# Patient Record
Sex: FEMALE | Race: BLACK OR AFRICAN AMERICAN | NOT HISPANIC OR LATINO | URBAN - METROPOLITAN AREA
[De-identification: names, ages, dates, MRNs, and addresses within clinical notes are randomized per-mention and may not be internally consistent; named-entity substitution may affect disease eponyms.]

---

## 2020-03-06 PROBLEM — 34000006 CROHN'S DISEASE: Status: ACTIVE | Noted: 2020-03-06

## 2020-03-06 PROBLEM — 271832001 FLATULENCE, ERUCTATION AND GAS PAIN: Status: ACTIVE | Noted: 2020-03-06

## 2020-06-12 ENCOUNTER — OFFICE VISIT (OUTPATIENT)
Dept: URBAN - METROPOLITAN AREA SURGERY CENTER 7 | Facility: SURGERY CENTER | Age: 65
End: 2020-06-12

## 2022-04-30 ENCOUNTER — TELEPHONE ENCOUNTER (OUTPATIENT)
Dept: URBAN - METROPOLITAN AREA CLINIC 121 | Facility: CLINIC | Age: 67
End: 2022-04-30

## 2022-04-30 RX ORDER — TRAMADOL HYDROCHLORIDE 50 MG/1
1/2 TO 1 TABLET PO Q 8 HOURS PRN JOINT PAIN TABLET, FILM COATED ORAL
OUTPATIENT
Start: 2015-12-11

## 2022-04-30 RX ORDER — ASPIRIN 325 MG/1
QD TABLET ORAL
OUTPATIENT
Start: 2014-07-15

## 2022-04-30 RX ORDER — ACETAMINOPHEN 325 MG/1
TABLET, FILM COATED ORAL
OUTPATIENT
Start: 2016-05-27

## 2022-04-30 RX ORDER — ADALIMUMAB 40MG/0.8ML
INJECT 1 40MG PEN SUBQ EVERY OTHER WEEK AS DIRECTED KIT SUBCUTANEOUS
OUTPATIENT
Start: 2015-06-16

## 2022-04-30 RX ORDER — METOPROLOL TARTRATE 25 MG/1
QD TABLET, FILM COATED ORAL
OUTPATIENT
Start: 2014-07-15

## 2022-04-30 RX ORDER — HYDROCHLOROTHIAZIDE 12.5 MG/1
TABLET ORAL
OUTPATIENT
Start: 2016-05-27 | End: 2017-02-13

## 2022-04-30 RX ORDER — FAMOTIDINE 20 MG/1
TABLET ORAL
OUTPATIENT
Start: 2017-02-13 | End: 2020-06-12

## 2022-04-30 RX ORDER — METOPROLOL TARTRATE 100 MG/1
1 TABLET PO BID TABLET, FILM COATED ORAL
OUTPATIENT
Start: 2015-07-07

## 2022-04-30 RX ORDER — FAMOTIDINE 20 MG/1
TABLET ORAL
OUTPATIENT
Start: 2017-02-13

## 2022-04-30 RX ORDER — METRONIDAZOLE 500 MG/1
TABLET ORAL
OUTPATIENT
Start: 2017-02-13

## 2022-04-30 RX ORDER — HYDROCHLOROTHIAZIDE 12.5 MG/1
TABLET ORAL
OUTPATIENT
Start: 2016-05-27

## 2022-04-30 RX ORDER — MESALAMINE 400 MG/1
2 CAPSULES TID WITH MEALS CAPSULE, DELAYED RELEASE ORAL
OUTPATIENT
Start: 2015-05-12 | End: 2015-07-07

## 2022-04-30 RX ORDER — ASPIRIN 325 MG/1
QD TABLET ORAL
OUTPATIENT
Start: 2014-07-15 | End: 2017-02-13

## 2022-04-30 RX ORDER — CIPROFLOXACIN HCL 500 MG
TABLET ORAL
OUTPATIENT
Start: 2017-02-13

## 2022-04-30 RX ORDER — CIPROFLOXACIN HCL 500 MG
TABLET ORAL
OUTPATIENT
Start: 2017-02-13 | End: 2020-06-12

## 2022-04-30 RX ORDER — METRONIDAZOLE 500 MG/1
TABLET ORAL
OUTPATIENT
Start: 2017-02-13 | End: 2020-06-12

## 2022-04-30 RX ORDER — MESALAMINE 400 MG/1
2 CAPSULES TID WITH MEALS CAPSULE, DELAYED RELEASE ORAL
OUTPATIENT
Start: 2015-05-12

## 2022-04-30 RX ORDER — TRAMADOL HYDROCHLORIDE 50 MG/1
1/2 TO 1 TABLET PO Q 8 HOURS PRN JOINT PAIN TABLET, FILM COATED ORAL
OUTPATIENT
Start: 2015-12-11 | End: 2017-02-13

## 2022-04-30 RX ORDER — ACETAMINOPHEN 325 MG/1
TABLET, FILM COATED ORAL
OUTPATIENT
Start: 2016-05-27 | End: 2017-02-13

## 2022-04-30 RX ORDER — METOPROLOL TARTRATE 100 MG/1
1 TABLET PO BID TABLET, FILM COATED ORAL
OUTPATIENT
Start: 2015-07-07 | End: 2020-06-12

## 2022-05-01 ENCOUNTER — TELEPHONE ENCOUNTER (OUTPATIENT)
Dept: URBAN - METROPOLITAN AREA CLINIC 121 | Facility: CLINIC | Age: 67
End: 2022-05-01

## 2022-05-01 RX ORDER — MESALAMINE 375 MG/1
4 CAPSULE PO QAM WITH BREAKFAST CAPSULE, EXTENDED RELEASE ORAL
Status: ACTIVE | COMMUNITY
Start: 2015-07-07

## 2022-05-01 RX ORDER — ADALIMUMAB 40MG/0.8ML
INJECT 1 40MG PEN SUBQ EVERY OTHER WEEK AS DIRECTED KIT SUBCUTANEOUS
Status: ACTIVE | COMMUNITY
Start: 2015-06-16

## 2022-05-01 RX ORDER — ADALIMUMAB 40MG/0.4ML
INJECT 1 PEN SQ EVERY 2 WEEKS KIT SUBCUTANEOUS
Status: ACTIVE | COMMUNITY
Start: 2020-02-25

## 2023-01-06 ENCOUNTER — TELEPHONE ENCOUNTER (OUTPATIENT)
Dept: URBAN - METROPOLITAN AREA CLINIC 27 | Facility: CLINIC | Age: 68
End: 2023-01-06

## 2024-01-11 ENCOUNTER — TELEPHONE ENCOUNTER (OUTPATIENT)
Dept: URBAN - METROPOLITAN AREA CLINIC 27 | Facility: CLINIC | Age: 69
End: 2024-01-11

## 2024-01-11 RX ORDER — ADALIMUMAB 40MG/0.4ML
INJECT 1 PEN KIT SUBCUTANEOUS EVERY 2 WEEKS
Qty: 2 PEN NEEDLE | Refills: 3
Start: 2020-02-25 | End: 2024-12-12

## 2024-01-29 ENCOUNTER — TELEPHONE ENCOUNTER (OUTPATIENT)
Dept: URBAN - METROPOLITAN AREA CLINIC 27 | Facility: CLINIC | Age: 69
End: 2024-01-29

## 2024-12-10 ENCOUNTER — TELEPHONE ENCOUNTER (OUTPATIENT)
Dept: URBAN - METROPOLITAN AREA CLINIC 27 | Facility: CLINIC | Age: 69
End: 2024-12-10

## 2024-12-10 RX ORDER — ADALIMUMAB 40MG/0.4ML
INJECT 1 PEN KIT SUBCUTANEOUS EVERY 2 WEEKS
Qty: 2 PEN NEEDLE | Refills: 3
Start: 2020-02-25 | End: 2025-11-11

## 2024-12-16 ENCOUNTER — TELEPHONE ENCOUNTER (OUTPATIENT)
Dept: URBAN - METROPOLITAN AREA CLINIC 27 | Facility: CLINIC | Age: 69
End: 2024-12-16

## 2024-12-20 ENCOUNTER — TELEPHONE ENCOUNTER (OUTPATIENT)
Dept: URBAN - METROPOLITAN AREA CLINIC 27 | Facility: CLINIC | Age: 69
End: 2024-12-20

## 2024-12-20 ENCOUNTER — OFFICE VISIT (OUTPATIENT)
Dept: URBAN - METROPOLITAN AREA CLINIC 29 | Facility: CLINIC | Age: 69
End: 2024-12-20
Payer: COMMERCIAL

## 2024-12-20 ENCOUNTER — DASHBOARD ENCOUNTERS (OUTPATIENT)
Age: 69
End: 2024-12-20

## 2024-12-20 ENCOUNTER — LAB OUTSIDE AN ENCOUNTER (OUTPATIENT)
Dept: URBAN - METROPOLITAN AREA CLINIC 29 | Facility: CLINIC | Age: 69
End: 2024-12-20

## 2024-12-20 VITALS
HEART RATE: 72 BPM | SYSTOLIC BLOOD PRESSURE: 153 MMHG | BODY MASS INDEX: 34.66 KG/M2 | DIASTOLIC BLOOD PRESSURE: 104 MMHG | WEIGHT: 203 LBS | HEIGHT: 64 IN

## 2024-12-20 DIAGNOSIS — K50.90 CROHN'S DISEASE, UNSPECIFIED, WITHOUT COMPLICATIONS: ICD-10-CM

## 2024-12-20 DIAGNOSIS — I10 ESSENTIAL (PRIMARY) HYPERTENSION: ICD-10-CM

## 2024-12-20 PROCEDURE — 99204 OFFICE O/P NEW MOD 45 MIN: CPT | Performed by: INTERNAL MEDICINE

## 2024-12-20 RX ORDER — ADALIMUMAB 40MG/0.8ML
INJECT 1 40MG PEN SUBQ EVERY OTHER WEEK AS DIRECTED KIT SUBCUTANEOUS
Status: ACTIVE | COMMUNITY
Start: 2015-06-16

## 2024-12-20 RX ORDER — MESALAMINE 375 MG/1
4 CAPSULE PO QAM WITH BREAKFAST CAPSULE, EXTENDED RELEASE ORAL
Status: ACTIVE | COMMUNITY
Start: 2015-07-07

## 2024-12-20 RX ORDER — VALSARTAN 80 MG/1
TAKE 1 TABLET EVERY DAY BY ORAL ROUTE IN THE MORNING FOR 90 DAYS, FOR BLOOD PRESSURE TABLET, FILM COATED ORAL
Qty: 90 EACH | Refills: 1 | Status: ON HOLD | COMMUNITY

## 2024-12-20 RX ORDER — VALSARTAN 80 MG/1
TAKE 1 TABLET EVERY DAY BY ORAL ROUTE IN THE MORNING FOR 90 DAYS, FOR BLOOD PRESSURE TABLET, FILM COATED ORAL
Qty: 90 EACH | Refills: 1 | Status: ACTIVE | COMMUNITY

## 2024-12-20 RX ORDER — HYDROCHLOROTHIAZIDE 50 MG/1
TAKE 1 TABLET BY MOUTH EVERY DAY FOR 30 DAYS TABLET ORAL
Qty: 90 EACH | Refills: 1 | Status: ACTIVE | COMMUNITY

## 2024-12-20 RX ORDER — POTASSIUM CHLORIDE 750 MG/1
TABLET, FILM COATED, EXTENDED RELEASE ORAL
Qty: 90 TABLET | Status: ACTIVE | COMMUNITY

## 2024-12-20 RX ORDER — ADALIMUMAB 40MG/0.4ML
INJECT 1 PEN KIT SUBCUTANEOUS EVERY 2 WEEKS
Qty: 2 PEN NEEDLE | Refills: 3 | Status: ACTIVE | COMMUNITY
Start: 2020-02-25 | End: 2025-11-11

## 2024-12-20 NOTE — HPI-TODAY'S VISIT:
Mrs. Gracia presents today requesting authorization for refill of her Humira.  I have not seen this patient since her initial diagnosis in 2020.  She is currently taking Humira every other week.  Her last colonoscopy was in 2020 which was notable for sigmoid colitis as well as colitis involving the descending and splenic flexure.  Otherwise, she has no other GI complaints.

## 2024-12-26 ENCOUNTER — LAB OUTSIDE AN ENCOUNTER (OUTPATIENT)
Dept: URBAN - METROPOLITAN AREA CLINIC 27 | Facility: CLINIC | Age: 69
End: 2024-12-26

## 2024-12-29 LAB
A/G RATIO: 1.3
ABSOLUTE BASOPHILS: 48
ABSOLUTE EOSINOPHILS: 173
ABSOLUTE LYMPHOCYTES: 3245
ABSOLUTE MONOCYTES: 518
ABSOLUTE NEUTROPHILS: 5616
ALBUMIN: 4.3
ALKALINE PHOSPHATASE: 80
ALT (SGPT): 13
AST (SGOT): 16
BASOPHILS: 0.5
BILIRUBIN, TOTAL: 0.4
BUN/CREATININE RATIO: (no result)
BUN: 13
C-REACTIVE PROTEIN, QUANT: 5.9
CALCIUM: 9.9
CARBON DIOXIDE, TOTAL: 28
CHLORIDE: 103
CREATININE: 0.65
EGFR: 96
EOSINOPHILS: 1.8
GLOBULIN, TOTAL: 3.2
GLUCOSE: 101
HEMATOCRIT: 38.3
HEMOGLOBIN: 12.2
LYMPHOCYTES: 33.8
MCH: 28.2
MCHC: 31.9
MCV: 88.5
MITOGEN-NIL: 8.11
MONOCYTES: 5.4
MPV: 11.2
NEUTROPHILS: 58.5
PLATELET COUNT: 294
POTASSIUM: 4.2
PROTEIN, TOTAL: 7.5
QUANTIFERON NIL VALUE: 0.03
QUANTIFERON TB1 AG VALUE: 0.02
QUANTIFERON TB2 AG VALUE: 0.01
QUANTIFERON-TB GOLD PLUS: NEGATIVE
RDW: 12
RED BLOOD CELL COUNT: 4.33
SODIUM: 142
WHITE BLOOD CELL COUNT: 9.6

## 2025-01-06 ENCOUNTER — LAB OUTSIDE AN ENCOUNTER (OUTPATIENT)
Dept: URBAN - METROPOLITAN AREA CLINIC 27 | Facility: CLINIC | Age: 70
End: 2025-01-06

## 2025-01-09 LAB — CALPROTECTIN, FECAL: 999

## 2025-01-13 ENCOUNTER — CLAIMS CREATED FROM THE CLAIM WINDOW (OUTPATIENT)
Dept: URBAN - METROPOLITAN AREA SURGERY CENTER 7 | Facility: SURGERY CENTER | Age: 70
End: 2025-01-13
Payer: COMMERCIAL

## 2025-01-13 ENCOUNTER — CLAIMS CREATED FROM THE CLAIM WINDOW (OUTPATIENT)
Dept: URBAN - METROPOLITAN AREA CLINIC 4 | Facility: CLINIC | Age: 70
End: 2025-01-13
Payer: COMMERCIAL

## 2025-01-13 DIAGNOSIS — K62.1 HYPERPLASTIC RECTAL POLYP: ICD-10-CM

## 2025-01-13 DIAGNOSIS — K50.10 CROHN'S DISEASE OF LARGE INTESTINE WITHOUT COMPLICATIONS: ICD-10-CM

## 2025-01-13 DIAGNOSIS — K62.1 RECTAL POLYP: ICD-10-CM

## 2025-01-13 DIAGNOSIS — K50.119 CROHN'S DISEASE OF LARGE INTESTINE WITH UNSPECIFIED COMPLICATIONS: ICD-10-CM

## 2025-01-13 DIAGNOSIS — K50.119 CROHN'S COLITIS, UNSPECIFIED COMPLICATION: ICD-10-CM

## 2025-01-13 DIAGNOSIS — Z12.11 COLON CANCER SCREENING: ICD-10-CM

## 2025-01-13 PROCEDURE — 00812 ANES LWR INTST SCR COLSC: CPT | Performed by: NURSE ANESTHETIST, CERTIFIED REGISTERED

## 2025-01-13 PROCEDURE — 00811 ANES LWR INTST NDSC NOS: CPT | Performed by: NURSE ANESTHETIST, CERTIFIED REGISTERED

## 2025-01-13 PROCEDURE — 88305 TISSUE EXAM BY PATHOLOGIST: CPT | Performed by: PATHOLOGY

## 2025-01-13 PROCEDURE — 45385 COLONOSCOPY W/LESION REMOVAL: CPT | Performed by: INTERNAL MEDICINE

## 2025-01-13 PROCEDURE — 45380 COLONOSCOPY AND BIOPSY: CPT | Performed by: INTERNAL MEDICINE

## 2025-01-13 RX ORDER — POTASSIUM CHLORIDE 750 MG/1
TABLET, FILM COATED, EXTENDED RELEASE ORAL
Qty: 90 TABLET | Status: ACTIVE | COMMUNITY

## 2025-01-13 RX ORDER — VALSARTAN 80 MG/1
TAKE 1 TABLET EVERY DAY BY ORAL ROUTE IN THE MORNING FOR 90 DAYS, FOR BLOOD PRESSURE TABLET, FILM COATED ORAL
Qty: 90 EACH | Refills: 1 | Status: ON HOLD | COMMUNITY

## 2025-01-13 RX ORDER — MESALAMINE 375 MG/1
4 CAPSULE PO QAM WITH BREAKFAST CAPSULE, EXTENDED RELEASE ORAL
Status: ACTIVE | COMMUNITY
Start: 2015-07-07

## 2025-01-13 RX ORDER — HYDROCHLOROTHIAZIDE 50 MG/1
TAKE 1 TABLET BY MOUTH EVERY DAY FOR 30 DAYS TABLET ORAL
Qty: 90 EACH | Refills: 1 | Status: ACTIVE | COMMUNITY

## 2025-01-13 RX ORDER — ADALIMUMAB 40MG/0.4ML
INJECT 1 PEN KIT SUBCUTANEOUS EVERY 2 WEEKS
Qty: 2 PEN NEEDLE | Refills: 3 | Status: ACTIVE | COMMUNITY
Start: 2020-02-25 | End: 2025-11-11

## 2025-01-13 RX ORDER — ADALIMUMAB 40MG/0.8ML
INJECT 1 40MG PEN SUBQ EVERY OTHER WEEK AS DIRECTED KIT SUBCUTANEOUS
Status: ACTIVE | COMMUNITY
Start: 2015-06-16

## 2025-01-13 RX ORDER — VALSARTAN 80 MG/1
TAKE 1 TABLET EVERY DAY BY ORAL ROUTE IN THE MORNING FOR 90 DAYS, FOR BLOOD PRESSURE TABLET, FILM COATED ORAL
Qty: 90 EACH | Refills: 1 | Status: ACTIVE | COMMUNITY

## 2025-01-21 ENCOUNTER — TELEPHONE ENCOUNTER (OUTPATIENT)
Dept: URBAN - METROPOLITAN AREA CLINIC 27 | Facility: CLINIC | Age: 70
End: 2025-01-21

## 2025-01-21 RX ORDER — RISANKIZUMAB-RZAA 180 MG/1.2
AT WEEK 12 KIT SUBCUTANEOUS
Qty: 180 | Refills: 0 | OUTPATIENT
Start: 2025-01-21

## 2025-01-21 RX ORDER — RISANKIZUMAB-RZAA 60 MG/ML
AS DIRECTED INJECTION INTRAVENOUS
Qty: 600 | Refills: 0 | OUTPATIENT
Start: 2025-01-21

## 2025-01-27 ENCOUNTER — OFFICE VISIT (OUTPATIENT)
Dept: URBAN - METROPOLITAN AREA CLINIC 27 | Facility: CLINIC | Age: 70
End: 2025-01-27

## 2025-02-07 ENCOUNTER — OFFICE VISIT (OUTPATIENT)
Dept: URBAN - METROPOLITAN AREA CLINIC 97 | Facility: CLINIC | Age: 70
End: 2025-02-07
Payer: COMMERCIAL

## 2025-02-07 VITALS
SYSTOLIC BLOOD PRESSURE: 143 MMHG | BODY MASS INDEX: 34.66 KG/M2 | TEMPERATURE: 99 F | DIASTOLIC BLOOD PRESSURE: 81 MMHG | RESPIRATION RATE: 18 BRPM | WEIGHT: 203 LBS | HEART RATE: 77 BPM | HEIGHT: 64 IN

## 2025-02-07 DIAGNOSIS — K50.90 CROHN'S DISEASE, UNSPECIFIED, WITHOUT COMPLICATIONS: ICD-10-CM

## 2025-02-07 PROCEDURE — 96413 CHEMO IV INFUSION 1 HR: CPT | Performed by: INTERNAL MEDICINE

## 2025-02-07 RX ORDER — VALSARTAN 80 MG/1
TAKE 1 TABLET EVERY DAY BY ORAL ROUTE IN THE MORNING FOR 90 DAYS, FOR BLOOD PRESSURE TABLET, FILM COATED ORAL
Qty: 90 EACH | Refills: 1 | Status: ACTIVE | COMMUNITY

## 2025-02-07 RX ORDER — ADALIMUMAB 40MG/0.4ML
INJECT 1 PEN KIT SUBCUTANEOUS EVERY 2 WEEKS
Qty: 2 PEN NEEDLE | Refills: 3 | Status: ACTIVE | COMMUNITY
Start: 2020-02-25 | End: 2025-11-11

## 2025-02-07 RX ORDER — HYDROCHLOROTHIAZIDE 50 MG/1
TAKE 1 TABLET BY MOUTH EVERY DAY FOR 30 DAYS TABLET ORAL
Qty: 90 EACH | Refills: 1 | Status: ACTIVE | COMMUNITY

## 2025-02-07 RX ORDER — RISANKIZUMAB-RZAA 60 MG/ML
AS DIRECTED INJECTION INTRAVENOUS
Qty: 600 | Refills: 0 | Status: ACTIVE | COMMUNITY
Start: 2025-01-21

## 2025-02-07 RX ORDER — MESALAMINE 375 MG/1
4 CAPSULE PO QAM WITH BREAKFAST CAPSULE, EXTENDED RELEASE ORAL
Status: ACTIVE | COMMUNITY
Start: 2015-07-07

## 2025-02-07 RX ORDER — ADALIMUMAB 40MG/0.8ML
INJECT 1 40MG PEN SUBQ EVERY OTHER WEEK AS DIRECTED KIT SUBCUTANEOUS
Status: ACTIVE | COMMUNITY
Start: 2015-06-16

## 2025-02-07 RX ORDER — RISANKIZUMAB-RZAA 180 MG/1.2
AT WEEK 12 KIT SUBCUTANEOUS
Qty: 180 | Refills: 0 | Status: ACTIVE | COMMUNITY
Start: 2025-01-21

## 2025-02-07 RX ORDER — VALSARTAN 80 MG/1
TAKE 1 TABLET EVERY DAY BY ORAL ROUTE IN THE MORNING FOR 90 DAYS, FOR BLOOD PRESSURE TABLET, FILM COATED ORAL
Qty: 90 EACH | Refills: 1 | Status: ON HOLD | COMMUNITY

## 2025-02-07 RX ORDER — POTASSIUM CHLORIDE 750 MG/1
TABLET, FILM COATED, EXTENDED RELEASE ORAL
Qty: 90 TABLET | Status: ACTIVE | COMMUNITY

## 2025-03-07 ENCOUNTER — OFFICE VISIT (OUTPATIENT)
Dept: URBAN - METROPOLITAN AREA CLINIC 97 | Facility: CLINIC | Age: 70
End: 2025-03-07
Payer: COMMERCIAL

## 2025-03-07 VITALS
RESPIRATION RATE: 18 BRPM | TEMPERATURE: 98.6 F | HEIGHT: 64 IN | SYSTOLIC BLOOD PRESSURE: 152 MMHG | HEART RATE: 66 BPM | DIASTOLIC BLOOD PRESSURE: 88 MMHG | BODY MASS INDEX: 34.66 KG/M2 | WEIGHT: 203 LBS

## 2025-03-07 DIAGNOSIS — K50.90 CROHN'S DISEASE, UNSPECIFIED, WITHOUT COMPLICATIONS: ICD-10-CM

## 2025-03-07 PROCEDURE — 96413 CHEMO IV INFUSION 1 HR: CPT | Performed by: INTERNAL MEDICINE

## 2025-03-07 RX ORDER — HYDROCHLOROTHIAZIDE 50 MG/1
TAKE 1 TABLET BY MOUTH EVERY DAY FOR 30 DAYS TABLET ORAL
Qty: 90 EACH | Refills: 1 | Status: ACTIVE | COMMUNITY

## 2025-03-07 RX ORDER — MESALAMINE 375 MG/1
4 CAPSULE PO QAM WITH BREAKFAST CAPSULE, EXTENDED RELEASE ORAL
Status: ACTIVE | COMMUNITY
Start: 2015-07-07

## 2025-03-07 RX ORDER — RISANKIZUMAB-RZAA 180 MG/1.2
AT WEEK 12 KIT SUBCUTANEOUS
Qty: 180 | Refills: 0 | Status: ACTIVE | COMMUNITY
Start: 2025-01-21

## 2025-03-07 RX ORDER — VALSARTAN 80 MG/1
TAKE 1 TABLET EVERY DAY BY ORAL ROUTE IN THE MORNING FOR 90 DAYS, FOR BLOOD PRESSURE TABLET, FILM COATED ORAL
Qty: 90 EACH | Refills: 1 | Status: ACTIVE | COMMUNITY

## 2025-03-07 RX ORDER — POTASSIUM CHLORIDE 750 MG/1
TABLET, FILM COATED, EXTENDED RELEASE ORAL
Qty: 90 TABLET | Status: ACTIVE | COMMUNITY

## 2025-03-07 RX ORDER — VALSARTAN 80 MG/1
TAKE 1 TABLET EVERY DAY BY ORAL ROUTE IN THE MORNING FOR 90 DAYS, FOR BLOOD PRESSURE TABLET, FILM COATED ORAL
Qty: 90 EACH | Refills: 1 | Status: ON HOLD | COMMUNITY

## 2025-03-07 RX ORDER — ADALIMUMAB 40MG/0.4ML
INJECT 1 PEN KIT SUBCUTANEOUS EVERY 2 WEEKS
Qty: 2 PEN NEEDLE | Refills: 3 | Status: ACTIVE | COMMUNITY
Start: 2020-02-25 | End: 2025-11-11

## 2025-03-07 RX ORDER — ADALIMUMAB 40MG/0.8ML
INJECT 1 40MG PEN SUBQ EVERY OTHER WEEK AS DIRECTED KIT SUBCUTANEOUS
Status: ACTIVE | COMMUNITY
Start: 2015-06-16

## 2025-03-07 RX ORDER — RISANKIZUMAB-RZAA 60 MG/ML
AS DIRECTED INJECTION INTRAVENOUS
Qty: 600 | Refills: 0 | Status: ACTIVE | COMMUNITY
Start: 2025-01-21

## 2025-03-12 ENCOUNTER — TELEPHONE ENCOUNTER (OUTPATIENT)
Dept: URBAN - METROPOLITAN AREA CLINIC 27 | Facility: CLINIC | Age: 70
End: 2025-03-12

## 2025-03-12 RX ORDER — RISANKIZUMAB-RZAA 180 MG/1.2
USE AS DIRECTED KIT SUBCUTANEOUS
Qty: 1 PACKET | Refills: 3 | OUTPATIENT
Start: 2025-01-21 | End: 2026-03-06

## 2025-04-04 ENCOUNTER — OFFICE VISIT (OUTPATIENT)
Dept: URBAN - METROPOLITAN AREA CLINIC 97 | Facility: CLINIC | Age: 70
End: 2025-04-04
Payer: COMMERCIAL

## 2025-04-04 DIAGNOSIS — K50.90 CROHN'S DISEASE, UNSPECIFIED, WITHOUT COMPLICATIONS: ICD-10-CM

## 2025-04-04 PROCEDURE — 96413 CHEMO IV INFUSION 1 HR: CPT | Performed by: INTERNAL MEDICINE

## 2025-04-04 RX ORDER — POTASSIUM CHLORIDE 750 MG/1
TABLET, FILM COATED, EXTENDED RELEASE ORAL
Qty: 90 TABLET | Status: ACTIVE | COMMUNITY

## 2025-04-04 RX ORDER — MESALAMINE 375 MG/1
4 CAPSULE PO QAM WITH BREAKFAST CAPSULE, EXTENDED RELEASE ORAL
Status: ACTIVE | COMMUNITY
Start: 2015-07-07

## 2025-04-04 RX ORDER — VALSARTAN 80 MG/1
TAKE 1 TABLET EVERY DAY BY ORAL ROUTE IN THE MORNING FOR 90 DAYS, FOR BLOOD PRESSURE TABLET, FILM COATED ORAL
Qty: 90 EACH | Refills: 1 | Status: ON HOLD | COMMUNITY

## 2025-04-04 RX ORDER — RISANKIZUMAB-RZAA 180 MG/1.2
USE AS DIRECTED KIT SUBCUTANEOUS
Qty: 1 PACKET | Refills: 3 | Status: ACTIVE | COMMUNITY
Start: 2025-01-21 | End: 2026-03-06

## 2025-04-04 RX ORDER — ADALIMUMAB 40MG/0.4ML
INJECT 1 PEN KIT SUBCUTANEOUS EVERY 2 WEEKS
Qty: 2 PEN NEEDLE | Refills: 3 | Status: ACTIVE | COMMUNITY
Start: 2020-02-25 | End: 2025-11-11

## 2025-04-04 RX ORDER — HYDROCHLOROTHIAZIDE 50 MG/1
TAKE 1 TABLET BY MOUTH EVERY DAY FOR 30 DAYS TABLET ORAL
Qty: 90 EACH | Refills: 1 | Status: ACTIVE | COMMUNITY

## 2025-04-04 RX ORDER — ADALIMUMAB 40MG/0.8ML
INJECT 1 40MG PEN SUBQ EVERY OTHER WEEK AS DIRECTED KIT SUBCUTANEOUS
Status: ACTIVE | COMMUNITY
Start: 2015-06-16

## 2025-04-04 RX ORDER — RISANKIZUMAB-RZAA 60 MG/ML
AS DIRECTED INJECTION INTRAVENOUS
Qty: 600 | Refills: 0 | Status: ACTIVE | COMMUNITY
Start: 2025-01-21

## 2025-04-04 RX ORDER — VALSARTAN 80 MG/1
TAKE 1 TABLET EVERY DAY BY ORAL ROUTE IN THE MORNING FOR 90 DAYS, FOR BLOOD PRESSURE TABLET, FILM COATED ORAL
Qty: 90 EACH | Refills: 1 | Status: ACTIVE | COMMUNITY

## 2025-04-18 ENCOUNTER — OFFICE VISIT (OUTPATIENT)
Dept: URBAN - METROPOLITAN AREA CLINIC 27 | Facility: CLINIC | Age: 70
End: 2025-04-18
Payer: COMMERCIAL

## 2025-04-18 DIAGNOSIS — K50.90 CROHN'S DISEASE, UNSPECIFIED, WITHOUT COMPLICATIONS: ICD-10-CM

## 2025-04-18 PROCEDURE — 99214 OFFICE O/P EST MOD 30 MIN: CPT | Performed by: INTERNAL MEDICINE

## 2025-04-18 RX ORDER — ADALIMUMAB 40MG/0.4ML
INJECT 1 PEN KIT SUBCUTANEOUS EVERY 2 WEEKS
Qty: 2 PEN NEEDLE | Refills: 3 | Status: ACTIVE | COMMUNITY
Start: 2020-02-25 | End: 2025-11-11

## 2025-04-18 RX ORDER — ADALIMUMAB 40MG/0.8ML
INJECT 1 40MG PEN SUBQ EVERY OTHER WEEK AS DIRECTED KIT SUBCUTANEOUS
Status: ACTIVE | COMMUNITY
Start: 2015-06-16

## 2025-04-18 RX ORDER — RISANKIZUMAB-RZAA 180 MG/1.2
USE AS DIRECTED KIT SUBCUTANEOUS
Qty: 1 PACKET | Refills: 3 | Status: ACTIVE | COMMUNITY
Start: 2025-01-21 | End: 2026-03-06

## 2025-04-18 RX ORDER — HYDROCHLOROTHIAZIDE 50 MG/1
TAKE 1 TABLET BY MOUTH EVERY DAY FOR 30 DAYS TABLET ORAL
Qty: 90 EACH | Refills: 1 | Status: ACTIVE | COMMUNITY

## 2025-04-18 RX ORDER — MESALAMINE 375 MG/1
4 CAPSULE PO QAM WITH BREAKFAST CAPSULE, EXTENDED RELEASE ORAL
Status: ACTIVE | COMMUNITY
Start: 2015-07-07

## 2025-04-18 RX ORDER — VALSARTAN 80 MG/1
TAKE 1 TABLET EVERY DAY BY ORAL ROUTE IN THE MORNING FOR 90 DAYS, FOR BLOOD PRESSURE TABLET, FILM COATED ORAL
Qty: 90 EACH | Refills: 1 | Status: ON HOLD | COMMUNITY

## 2025-04-18 RX ORDER — POTASSIUM CHLORIDE 750 MG/1
TABLET, FILM COATED, EXTENDED RELEASE ORAL
Qty: 90 TABLET | Status: ACTIVE | COMMUNITY

## 2025-04-18 RX ORDER — RISANKIZUMAB-RZAA 60 MG/ML
AS DIRECTED INJECTION INTRAVENOUS
Qty: 600 | Refills: 0 | Status: ACTIVE | COMMUNITY
Start: 2025-01-21

## 2025-04-18 RX ORDER — VALSARTAN 80 MG/1
TAKE 1 TABLET EVERY DAY BY ORAL ROUTE IN THE MORNING FOR 90 DAYS, FOR BLOOD PRESSURE TABLET, FILM COATED ORAL
Qty: 90 EACH | Refills: 1 | Status: ACTIVE | COMMUNITY

## 2025-04-18 NOTE — HPI-TODAY'S VISIT:
Mrs. Gracia presents today for follow-up of her Crohn's disease she is currently having 2-3 bowel movements per day.  She reports that she has been experiencing some pain in her knees and elbows.  However, she is tolerating Skyrizi.  She has no other GI complaints.

## 2025-04-21 ENCOUNTER — TELEPHONE ENCOUNTER (OUTPATIENT)
Dept: URBAN - METROPOLITAN AREA CLINIC 27 | Facility: CLINIC | Age: 70
End: 2025-04-21

## 2025-06-17 ENCOUNTER — TELEPHONE ENCOUNTER (OUTPATIENT)
Dept: URBAN - METROPOLITAN AREA CLINIC 27 | Facility: CLINIC | Age: 70
End: 2025-06-17

## 2025-07-21 ENCOUNTER — TELEPHONE ENCOUNTER (OUTPATIENT)
Dept: URBAN - METROPOLITAN AREA CLINIC 27 | Facility: CLINIC | Age: 70
End: 2025-07-21

## 2025-08-08 ENCOUNTER — OFFICE VISIT (OUTPATIENT)
Dept: URBAN - METROPOLITAN AREA CLINIC 27 | Facility: CLINIC | Age: 70
End: 2025-08-08
Payer: COMMERCIAL

## 2025-08-08 DIAGNOSIS — R14.0 ABDOMINAL BLOATING: ICD-10-CM

## 2025-08-08 DIAGNOSIS — K50.90 CROHN'S DISEASE, UNSPECIFIED, WITHOUT COMPLICATIONS: ICD-10-CM

## 2025-08-08 PROBLEM — 116289008: Status: ACTIVE | Noted: 2025-08-08

## 2025-08-08 PROCEDURE — 99214 OFFICE O/P EST MOD 30 MIN: CPT | Performed by: INTERNAL MEDICINE

## 2025-08-08 RX ORDER — VALSARTAN 80 MG/1
TAKE 1 TABLET EVERY DAY BY ORAL ROUTE IN THE MORNING FOR 90 DAYS, FOR BLOOD PRESSURE TABLET, FILM COATED ORAL
Qty: 90 EACH | Refills: 1 | Status: ON HOLD | COMMUNITY

## 2025-08-08 RX ORDER — ADALIMUMAB 40MG/0.8ML
INJECT 1 40MG PEN SUBQ EVERY OTHER WEEK AS DIRECTED KIT SUBCUTANEOUS
Status: ACTIVE | COMMUNITY
Start: 2015-06-16

## 2025-08-08 RX ORDER — MESALAMINE 375 MG/1
4 CAPSULE PO QAM WITH BREAKFAST CAPSULE, EXTENDED RELEASE ORAL
Status: ACTIVE | COMMUNITY
Start: 2015-07-07

## 2025-08-08 RX ORDER — POTASSIUM CHLORIDE 750 MG/1
TABLET, FILM COATED, EXTENDED RELEASE ORAL
Qty: 90 TABLET | Status: ACTIVE | COMMUNITY

## 2025-08-08 RX ORDER — ADALIMUMAB 40MG/0.4ML
INJECT 1 PEN KIT SUBCUTANEOUS EVERY 2 WEEKS
Qty: 2 PEN NEEDLE | Refills: 3 | Status: ACTIVE | COMMUNITY
Start: 2020-02-25 | End: 2025-11-11

## 2025-08-08 RX ORDER — RISANKIZUMAB-RZAA 180 MG/1.2
USE AS DIRECTED KIT SUBCUTANEOUS
Qty: 1 PACKET | Refills: 3 | Status: ACTIVE | COMMUNITY
Start: 2025-01-21 | End: 2026-03-06

## 2025-08-08 RX ORDER — RISANKIZUMAB-RZAA 60 MG/ML
AS DIRECTED INJECTION INTRAVENOUS
Qty: 600 | Refills: 0 | Status: ACTIVE | COMMUNITY
Start: 2025-01-21

## 2025-08-08 RX ORDER — HYDROCHLOROTHIAZIDE 50 MG/1
TAKE 1 TABLET BY MOUTH EVERY DAY FOR 30 DAYS TABLET ORAL
Qty: 90 EACH | Refills: 1 | Status: ACTIVE | COMMUNITY

## 2025-08-08 RX ORDER — METRONIDAZOLE 500 MG/1
1 TABLET TABLET ORAL THREE TIMES A DAY
Qty: 21 TABLET | OUTPATIENT
Start: 2025-08-08 | End: 2025-08-15

## 2025-08-08 RX ORDER — VALSARTAN 80 MG/1
TAKE 1 TABLET EVERY DAY BY ORAL ROUTE IN THE MORNING FOR 90 DAYS, FOR BLOOD PRESSURE TABLET, FILM COATED ORAL
Qty: 90 EACH | Refills: 1 | Status: ACTIVE | COMMUNITY